# Patient Record
Sex: MALE | Race: WHITE | Employment: OTHER | URBAN - NONMETROPOLITAN AREA
[De-identification: names, ages, dates, MRNs, and addresses within clinical notes are randomized per-mention and may not be internally consistent; named-entity substitution may affect disease eponyms.]

---

## 2017-03-22 ENCOUNTER — OFFICE VISIT (OUTPATIENT)
Age: 47
End: 2017-03-22

## 2017-03-22 ENCOUNTER — TELEPHONE (OUTPATIENT)
Dept: FAMILY MEDICINE CLINIC | Age: 47
End: 2017-03-22

## 2017-03-22 VITALS
HEIGHT: 72 IN | RESPIRATION RATE: 16 BRPM | TEMPERATURE: 99.1 F | DIASTOLIC BLOOD PRESSURE: 64 MMHG | WEIGHT: 236 LBS | BODY MASS INDEX: 31.97 KG/M2 | HEART RATE: 88 BPM | SYSTOLIC BLOOD PRESSURE: 112 MMHG

## 2017-03-22 DIAGNOSIS — K61.0 PERIANAL ABSCESS: Primary | ICD-10-CM

## 2017-03-22 PROCEDURE — 99202 OFFICE O/P NEW SF 15 MIN: CPT | Performed by: SURGERY

## 2017-03-22 RX ORDER — CIPROFLOXACIN 500 MG/1
500 TABLET, FILM COATED ORAL 2 TIMES DAILY
Qty: 20 TABLET | Refills: 0 | Status: SHIPPED | OUTPATIENT
Start: 2017-03-22 | End: 2017-03-22 | Stop reason: ALTCHOICE

## 2017-03-22 ASSESSMENT — ENCOUNTER SYMPTOMS
FACIAL SWELLING: 0
WHEEZING: 0
NAUSEA: 0
CHOKING: 0
ABDOMINAL PAIN: 0
ROS SKIN COMMENTS: PERIANAL ABSCESS
STRIDOR: 0
VOMITING: 0
EYE DISCHARGE: 0
BLOOD IN STOOL: 0
SORE THROAT: 0
PHOTOPHOBIA: 0
SINUS PRESSURE: 0
COLOR CHANGE: 0
EYE REDNESS: 0
DIARRHEA: 0
RHINORRHEA: 0
TROUBLE SWALLOWING: 0
RECTAL PAIN: 1
CHEST TIGHTNESS: 0
ANAL BLEEDING: 1
APNEA: 0
BACK PAIN: 0
EYE ITCHING: 0
CONSTIPATION: 0
ABDOMINAL DISTENTION: 0
VOICE CHANGE: 0
COUGH: 0
SHORTNESS OF BREATH: 0
EYE PAIN: 0

## 2017-03-28 ENCOUNTER — TELEPHONE (OUTPATIENT)
Age: 47
End: 2017-03-28

## 2017-09-13 ENCOUNTER — TELEPHONE (OUTPATIENT)
Dept: SURGERY | Age: 47
End: 2017-09-13

## 2017-09-13 ENCOUNTER — TELEPHONE (OUTPATIENT)
Dept: FAMILY MEDICINE CLINIC | Age: 47
End: 2017-09-13

## 2017-09-13 RX ORDER — CIPROFLOXACIN 500 MG/1
500 TABLET, FILM COATED ORAL 2 TIMES DAILY
Qty: 20 TABLET | Refills: 0 | Status: SHIPPED | OUTPATIENT
Start: 2017-09-13 | End: 2017-09-23

## 2017-09-13 NOTE — TELEPHONE ENCOUNTER
Called for another perianal cyst  It opened and drained today  Started on cipro  He tried to contact your office today  Let me know what you would like to do with this or just call him  thanks

## 2018-08-21 ENCOUNTER — TELEPHONE (OUTPATIENT)
Dept: FAMILY MEDICINE CLINIC | Age: 48
End: 2018-08-21

## 2018-08-21 DIAGNOSIS — R07.9 CHEST PAIN, UNSPECIFIED TYPE: Primary | ICD-10-CM

## 2018-08-21 LAB
ALBUMIN SERPL-MCNC: 4.6 G/DL
ALP BLD-CCNC: 80 U/L
ALT SERPL-CCNC: 21 U/L
AMYLASE: 30 UNITS/L
ANION GAP SERPL CALCULATED.3IONS-SCNC: 11 MMOL/L
AST SERPL-CCNC: 19 U/L
BASOPHILS ABSOLUTE: NORMAL /ΜL
BASOPHILS RELATIVE PERCENT: NORMAL %
BILIRUB SERPL-MCNC: 0.6 MG/DL (ref 0.1–1.4)
BUN BLDV-MCNC: 17 MG/DL
CALCIUM SERPL-MCNC: 9.7 MG/DL
CHLORIDE BLD-SCNC: 107 MMOL/L
CO2: 28 MMOL/L
CREAT SERPL-MCNC: 0.9 MG/DL
EOSINOPHILS ABSOLUTE: NORMAL /ΜL
EOSINOPHILS RELATIVE PERCENT: NORMAL %
GFR CALCULATED: >60
GLUCOSE BLD-MCNC: 112 MG/DL
HCT VFR BLD CALC: 47.8 % (ref 41–53)
HEMOGLOBIN: 16.3 G/DL (ref 13.5–17.5)
LIPASE: 25 UNITS/L
LYMPHOCYTES ABSOLUTE: NORMAL /ΜL
LYMPHOCYTES RELATIVE PERCENT: NORMAL %
MCH RBC QN AUTO: 33.1 PG
MCHC RBC AUTO-ENTMCNC: 34.1 G/DL
MCV RBC AUTO: 96.9 FL
MONOCYTES ABSOLUTE: NORMAL /ΜL
MONOCYTES RELATIVE PERCENT: NORMAL %
NEUTROPHILS ABSOLUTE: NORMAL /ΜL
NEUTROPHILS RELATIVE PERCENT: NORMAL %
PDW BLD-RTO: 13.1 %
PLATELET # BLD: 235 K/ΜL
PMV BLD AUTO: 10.5 FL
POTASSIUM SERPL-SCNC: 3.8 MMOL/L
RBC # BLD: 4.93 10^6/ΜL
SODIUM BLD-SCNC: 142 MMOL/L
TOTAL PROTEIN: 7.1
TSH SERPL DL<=0.05 MIU/L-ACNC: 2.74 UIU/ML
WBC # BLD: 6 10^3/ML

## 2018-08-22 ENCOUNTER — TELEPHONE (OUTPATIENT)
Dept: FAMILY MEDICINE CLINIC | Age: 48
End: 2018-08-22

## 2018-08-22 DIAGNOSIS — R93.2 ABNORMAL GALLBLADDER ULTRASOUND: ICD-10-CM

## 2018-08-22 DIAGNOSIS — R10.10 UPPER ABDOMINAL PAIN: Primary | ICD-10-CM

## 2018-08-23 ENCOUNTER — TELEPHONE (OUTPATIENT)
Dept: FAMILY MEDICINE CLINIC | Age: 48
End: 2018-08-23

## 2018-08-23 DIAGNOSIS — R07.89 OTHER CHEST PAIN: ICD-10-CM

## 2018-08-23 DIAGNOSIS — R06.02 SOB (SHORTNESS OF BREATH) ON EXERTION: Primary | ICD-10-CM

## 2018-08-23 NOTE — TELEPHONE ENCOUNTER
Pt is to go to [facility] [address] on 08/28/2018 @ 7am. NPO 8 hours prior. [facility].

## 2018-09-04 ENCOUNTER — TELEPHONE (OUTPATIENT)
Dept: FAMILY MEDICINE CLINIC | Age: 48
End: 2018-09-04

## 2018-09-04 DIAGNOSIS — R07.9 CHEST PAIN ON EXERTION: ICD-10-CM

## 2018-09-04 DIAGNOSIS — R94.31 ABNORMAL EKG: ICD-10-CM

## 2018-09-04 DIAGNOSIS — R07.9 CHEST PAIN, UNSPECIFIED TYPE: Primary | ICD-10-CM

## 2018-09-12 ENCOUNTER — TELEPHONE (OUTPATIENT)
Dept: FAMILY MEDICINE CLINIC | Age: 48
End: 2018-09-12

## 2018-09-26 ENCOUNTER — TELEPHONE (OUTPATIENT)
Dept: FAMILY MEDICINE CLINIC | Age: 48
End: 2018-09-26

## 2018-09-26 DIAGNOSIS — Z01.818 PRE-OP TESTING: Primary | ICD-10-CM

## 2018-09-26 NOTE — TELEPHONE ENCOUNTER
Per direction of Dr. Chiquita Funez, recent lab results and new orders for CBC and BMP faxed to Dr. Tubbs Novel office @ [phone].

## 2018-09-26 NOTE — TELEPHONE ENCOUNTER
[facility] CALLED ASKING FOR ORDERS FOR US AND STRESS TEST TO BE FAXED [phone]. [address] FAXED.

## 2018-09-27 ENCOUNTER — TELEPHONE (OUTPATIENT)
Dept: FAMILY MEDICINE CLINIC | Age: 48
End: 2018-09-27

## 2018-09-27 DIAGNOSIS — I11.9 HYPERTENSIVE LEFT VENTRICULAR HYPERTROPHY, WITHOUT HEART FAILURE: Primary | ICD-10-CM

## 2018-09-27 DIAGNOSIS — I10 ESSENTIAL HYPERTENSION: ICD-10-CM

## 2018-09-27 LAB
BASOPHILS ABSOLUTE: NORMAL /ΜL
BASOPHILS ABSOLUTE: NORMAL /ΜL
BASOPHILS RELATIVE PERCENT: NORMAL %
BASOPHILS RELATIVE PERCENT: NORMAL %
BUN BLDV-MCNC: 18 MG/DL
CALCIUM SERPL-MCNC: 9.9 MG/DL
CHLORIDE BLD-SCNC: 107 MMOL/L
CO2: 28 MMOL/L
CREAT SERPL-MCNC: 0.88 MG/DL
EOSINOPHILS ABSOLUTE: NORMAL /ΜL
EOSINOPHILS ABSOLUTE: NORMAL /ΜL
EOSINOPHILS RELATIVE PERCENT: NORMAL %
EOSINOPHILS RELATIVE PERCENT: NORMAL %
GFR CALCULATED: >60
GLUCOSE BLD-MCNC: 73 MG/DL
HCT VFR BLD CALC: 48.6 % (ref 41–53)
HCT VFR BLD CALC: 48.6 % (ref 41–53)
HEMOGLOBIN: 16.5 G/DL (ref 13.5–17.5)
HEMOGLOBIN: 16.5 G/DL (ref 13.5–17.5)
LYMPHOCYTES ABSOLUTE: NORMAL /ΜL
LYMPHOCYTES ABSOLUTE: NORMAL /ΜL
LYMPHOCYTES RELATIVE PERCENT: NORMAL %
LYMPHOCYTES RELATIVE PERCENT: NORMAL %
MCH RBC QN AUTO: 33.1 PG
MCH RBC QN AUTO: 33.1 PG
MCHC RBC AUTO-ENTMCNC: 33.9 G/DL
MCHC RBC AUTO-ENTMCNC: 33.9 G/DL
MCV RBC AUTO: 98 FL
MCV RBC AUTO: 98 FL
MONOCYTES ABSOLUTE: NORMAL /ΜL
MONOCYTES ABSOLUTE: NORMAL /ΜL
MONOCYTES RELATIVE PERCENT: NORMAL %
MONOCYTES RELATIVE PERCENT: NORMAL %
NEUTROPHILS ABSOLUTE: NORMAL /ΜL
NEUTROPHILS ABSOLUTE: NORMAL /ΜL
NEUTROPHILS RELATIVE PERCENT: NORMAL %
NEUTROPHILS RELATIVE PERCENT: NORMAL %
PLATELET # BLD: 214 K/ΜL
PLATELET # BLD: 214 K/ΜL
PMV BLD AUTO: 10.5 FL
PMV BLD AUTO: NORMAL FL
POTASSIUM SERPL-SCNC: 4.5 MMOL/L
RBC # BLD: 4.98 10^6/ΜL
RBC # BLD: 4.98 10^6/ΜL
SODIUM BLD-SCNC: 144 MMOL/L
WBC # BLD: 5.2 10^3/ML
WBC # BLD: 5.2 10^3/ML

## 2018-09-27 NOTE — TELEPHONE ENCOUNTER
Estelita Cloud was seen today for hypertension. Diagnoses and all orders for this visit:    Hypertensive left ventricular hypertrophy, without heart failure  -     Echocardiogram complete; Future    Essential hypertension  -     Echocardiogram complete;  Future

## 2018-10-01 ENCOUNTER — TELEPHONE (OUTPATIENT)
Dept: FAMILY MEDICINE CLINIC | Age: 48
End: 2018-10-01

## 2018-10-03 ENCOUNTER — TELEPHONE (OUTPATIENT)
Dept: FAMILY MEDICINE CLINIC | Age: 48
End: 2018-10-03

## 2020-12-30 ENCOUNTER — TELEPHONE (OUTPATIENT)
Dept: FAMILY MEDICINE CLINIC | Age: 50
End: 2020-12-30

## 2020-12-30 ENCOUNTER — NURSE ONLY (OUTPATIENT)
Dept: LAB | Age: 50
End: 2020-12-30

## 2020-12-30 ENCOUNTER — NURSE ONLY (OUTPATIENT)
Dept: FAMILY MEDICINE CLINIC | Age: 50
End: 2020-12-30

## 2020-12-30 VITALS — HEART RATE: 81 BPM | SYSTOLIC BLOOD PRESSURE: 130 MMHG | DIASTOLIC BLOOD PRESSURE: 88 MMHG

## 2020-12-30 LAB
ALBUMIN SERPL-MCNC: 4.8 G/DL (ref 3.5–5.1)
ALP BLD-CCNC: 77 U/L (ref 38–126)
ALT SERPL-CCNC: 24 U/L (ref 11–66)
ANION GAP SERPL CALCULATED.3IONS-SCNC: 11 MEQ/L (ref 8–16)
AST SERPL-CCNC: 22 U/L (ref 5–40)
BILIRUB SERPL-MCNC: 0.5 MG/DL (ref 0.3–1.2)
BUN BLDV-MCNC: 13 MG/DL (ref 7–22)
CALCIUM SERPL-MCNC: 9.8 MG/DL (ref 8.5–10.5)
CHLORIDE BLD-SCNC: 103 MEQ/L (ref 98–111)
CHOLESTEROL, TOTAL: 269 MG/DL (ref 100–199)
CO2: 27 MEQ/L (ref 23–33)
CREAT SERPL-MCNC: 0.7 MG/DL (ref 0.4–1.2)
ERYTHROCYTE [DISTWIDTH] IN BLOOD BY AUTOMATED COUNT: 12.9 % (ref 11.5–14.5)
ERYTHROCYTE [DISTWIDTH] IN BLOOD BY AUTOMATED COUNT: 47.9 FL (ref 35–45)
GFR SERPL CREATININE-BSD FRML MDRD: > 90 ML/MIN/1.73M2
GLUCOSE FASTING: 99 MG/DL (ref 70–108)
HCT VFR BLD CALC: 49.7 % (ref 42–52)
HDLC SERPL-MCNC: 63 MG/DL
HEMOGLOBIN: 16.1 GM/DL (ref 14–18)
LDL CHOLESTEROL CALCULATED: 188 MG/DL
MCH RBC QN AUTO: 32.7 PG (ref 26–33)
MCHC RBC AUTO-ENTMCNC: 32.4 GM/DL (ref 32.2–35.5)
MCV RBC AUTO: 100.8 FL (ref 80–94)
PLATELET # BLD: 232 THOU/MM3 (ref 130–400)
PMV BLD AUTO: 12.1 FL (ref 9.4–12.4)
POTASSIUM SERPL-SCNC: 4.1 MEQ/L (ref 3.5–5.2)
PROSTATE SPECIFIC ANTIGEN: 1.45 NG/ML (ref 0–1)
RBC # BLD: 4.93 MILL/MM3 (ref 4.7–6.1)
SODIUM BLD-SCNC: 141 MEQ/L (ref 135–145)
TOTAL PROTEIN: 7.6 G/DL (ref 6.1–8)
TRIGL SERPL-MCNC: 89 MG/DL (ref 0–199)
TSH SERPL DL<=0.05 MIU/L-ACNC: 1.91 UIU/ML (ref 0.4–4.2)
WBC # BLD: 6 THOU/MM3 (ref 4.8–10.8)

## 2020-12-30 PROCEDURE — 99999 PR OFFICE/OUTPT VISIT,PROCEDURE ONLY: CPT | Performed by: FAMILY MEDICINE

## 2020-12-30 NOTE — PROGRESS NOTES
Patient presents for blood pressure check.      Patient states just not feeling well, seeing spots    BP: 130/88  P:     81

## 2021-01-21 ENCOUNTER — TELEPHONE (OUTPATIENT)
Dept: FAMILY MEDICINE CLINIC | Age: 51
End: 2021-01-21

## 2021-01-21 DIAGNOSIS — R19.8 ABDOMINAL GUARDING: ICD-10-CM

## 2021-01-21 DIAGNOSIS — R10.829 REBOUND ABDOMINAL TENDERNESS: ICD-10-CM

## 2021-01-21 DIAGNOSIS — R10.31 ACUTE RIGHT LOWER QUADRANT PAIN: Primary | ICD-10-CM

## 2021-01-21 DIAGNOSIS — Z01.818 PREOP EXAMINATION: Primary | ICD-10-CM

## 2021-01-21 LAB
ALBUMIN SERPL-MCNC: 4.2 G/DL
ALP BLD-CCNC: 71 U/L
ALT SERPL-CCNC: 22 U/L
AMYLASE: 48 UNITS/L
ANION GAP SERPL CALCULATED.3IONS-SCNC: 10 MMOL/L
AST SERPL-CCNC: 18 U/L
BASOPHILS ABSOLUTE: 0 /ΜL
BASOPHILS RELATIVE PERCENT: 0.9 %
BILIRUB SERPL-MCNC: 0.6 MG/DL (ref 0.1–1.4)
BILIRUBIN, URINE: NEGATIVE
BLOOD, URINE: NEGATIVE
BUN BLDV-MCNC: NORMAL MG/DL
CALCIUM SERPL-MCNC: 8.9 MG/DL
CHLORIDE BLD-SCNC: 104 MMOL/L
CLARITY: CLEAR
CO2: 27 MMOL/L
COLOR: YELLOW
CREAT SERPL-MCNC: 0.82 MG/DL
EOSINOPHILS ABSOLUTE: 0.3 /ΜL
EOSINOPHILS RELATIVE PERCENT: 5.2 %
GFR CALCULATED: >60
GLUCOSE BLD-MCNC: 90 MG/DL
GLUCOSE URINE: NEGATIVE
HCT VFR BLD CALC: 45 % (ref 41–53)
HEMOGLOBIN: 15.4 G/DL (ref 13.5–17.5)
KETONES, URINE: NEGATIVE
LEUKOCYTE ESTERASE, URINE: NEGATIVE
LIPASE: 31 UNITS/L
LYMPHOCYTES ABSOLUTE: 1.4 /ΜL
LYMPHOCYTES RELATIVE PERCENT: 28.1 %
MCH RBC QN AUTO: 32.9 PG
MCHC RBC AUTO-ENTMCNC: 34.3 G/DL
MCV RBC AUTO: 96 FL
MONOCYTES ABSOLUTE: 0.4 /ΜL
MONOCYTES RELATIVE PERCENT: 8.7 %
NEUTROPHILS ABSOLUTE: 2.9 /ΜL
NEUTROPHILS RELATIVE PERCENT: 57.1 %
NITRITE, URINE: NEGATIVE
PH UA: 6 (ref 4.5–8)
PLATELET # BLD: 242 K/ΜL
PMV BLD AUTO: 10 FL
POTASSIUM SERPL-SCNC: 3.7 MMOL/L
PROTEIN UA: NEGATIVE
RBC # BLD: 4.69 10^6/ΜL
SODIUM BLD-SCNC: 141 MMOL/L
SPECIFIC GRAVITY, URINE: 1.02
TOTAL PROTEIN: 7.7
UROBILINOGEN, URINE: NORMAL
WBC # BLD: 5.1 10^3/ML

## 2021-01-21 RX ORDER — CIPROFLOXACIN 500 MG/1
500 TABLET, FILM COATED ORAL 2 TIMES DAILY
Qty: 28 TABLET | Refills: 0 | Status: SHIPPED | OUTPATIENT
Start: 2021-01-21 | End: 2021-09-08 | Stop reason: SDUPTHER

## 2021-01-21 RX ORDER — METRONIDAZOLE 500 MG/1
500 TABLET ORAL 3 TIMES DAILY
Qty: 30 TABLET | Refills: 0 | Status: SHIPPED | OUTPATIENT
Start: 2021-01-21 | End: 2021-09-08 | Stop reason: SDUPTHER

## 2021-01-21 NOTE — TELEPHONE ENCOUNTER
Clear fluids for 24 hours then soft foods until pain is gone  Start cipro, if not better in 3 days then start flagyl (no alcohol while taking it)

## 2021-01-21 NOTE — TELEPHONE ENCOUNTER
Patient called with sharp right lower quadrant pain  He reports that when he pushes on that area it is much worse (guarding) and if he lets go really fast it is even more sharp (rebound). he has also had chills on and off since the pain started 5 days ago. Diagnoses and all orders for this visit:    Acute right lower quadrant pain  -     CBC; Future  -     Comprehensive Metabolic Panel, Fasting; Future  -     Amylase; Future  -     Lipase; Future  -     Urine Rt Reflex To Culture  -     CT ABDOMEN PELVIS WO CONTRAST Additional Contrast? None; Future    Rebound abdominal tenderness  -     CBC; Future  -     Comprehensive Metabolic Panel, Fasting; Future  -     Amylase; Future  -     Lipase; Future  -     Urine Rt Reflex To Culture  -     CT ABDOMEN PELVIS WO CONTRAST Additional Contrast? None; Future    Abdominal guarding  -     CBC; Future  -     Comprehensive Metabolic Panel, Fasting; Future  -     Amylase; Future  -     Lipase;  Future  -     Urine Rt Reflex To Culture  -     CT ABDOMEN PELVIS WO CONTRAST Additional Contrast? None; Future

## 2021-02-18 ENCOUNTER — TELEPHONE (OUTPATIENT)
Dept: FAMILY MEDICINE CLINIC | Age: 51
End: 2021-02-18

## 2021-02-18 DIAGNOSIS — K57.92 DIVERTICULITIS: Primary | ICD-10-CM

## 2021-02-18 NOTE — TELEPHONE ENCOUNTER
Schedule repeat abdomen and pelvis CT to recheck diverticulitis in April or May in [facility]  Call wife with approval she may be able to schedule at the hospital

## 2021-02-18 NOTE — TELEPHONE ENCOUNTER
Order has been differed until 21 as too soon to approve d/t service date will  prior to pt having testing completed.      Will notify wife once referral is closer to due date

## 2021-05-18 ENCOUNTER — TELEPHONE (OUTPATIENT)
Dept: FAMILY MEDICINE CLINIC | Age: 51
End: 2021-05-18

## 2021-05-18 NOTE — TELEPHONE ENCOUNTER
Pt in [place] to have CT abdomen pelvis completed. Per telephone encounter from 02/18/2021 states pt should have a repeat of CT abdomen/pelvis to recheck diverticulitis in April or May in [facility]. Wanted to confirm he is needing this done before authorizing and scheduling.

## 2021-07-26 ENCOUNTER — TELEPHONE (OUTPATIENT)
Dept: FAMILY MEDICINE CLINIC | Age: 51
End: 2021-07-26

## 2021-07-26 ENCOUNTER — HOSPITAL ENCOUNTER (OUTPATIENT)
Dept: CT IMAGING | Age: 51
Discharge: HOME | End: 2021-07-26
Payer: COMMERCIAL

## 2021-07-26 ENCOUNTER — HOSPITAL ENCOUNTER (OUTPATIENT)
Dept: CT IMAGING | Age: 51
Discharge: HOME | End: 2021-07-26

## 2021-07-26 DIAGNOSIS — E78.00 PURE HYPERCHOLESTEROLEMIA: ICD-10-CM

## 2021-07-26 DIAGNOSIS — R19.8 ABDOMINAL GUARDING: ICD-10-CM

## 2021-07-26 DIAGNOSIS — Z00.6 EXAMINATION FOR NORMAL COMPARISON OR CONTROL IN CLINICAL RESEARCH: ICD-10-CM

## 2021-07-26 DIAGNOSIS — K57.92 DIVERTICULITIS: Primary | ICD-10-CM

## 2021-07-26 DIAGNOSIS — R10.31 ACUTE RIGHT LOWER QUADRANT PAIN: ICD-10-CM

## 2021-07-26 DIAGNOSIS — N40.0 ENLARGED PROSTATE: ICD-10-CM

## 2021-07-26 DIAGNOSIS — R16.1 SPLEEN ENLARGED: ICD-10-CM

## 2021-07-26 DIAGNOSIS — K57.92 DIVERTICULITIS: ICD-10-CM

## 2021-07-26 PROCEDURE — 6360000004 HC RX CONTRAST MEDICATION: Performed by: FAMILY MEDICINE

## 2021-07-26 PROCEDURE — 74177 CT ABD & PELVIS W/CONTRAST: CPT

## 2021-07-26 RX ADMIN — IOPAMIDOL 100 ML: 755 INJECTION, SOLUTION INTRAVENOUS at 11:31

## 2021-07-26 RX ADMIN — IOHEXOL 50 ML: 240 INJECTION, SOLUTION INTRATHECAL; INTRAVASCULAR; INTRAVENOUS; ORAL at 10:25

## 2021-07-27 ENCOUNTER — TELEPHONE (OUTPATIENT)
Dept: FAMILY MEDICINE CLINIC | Age: 51
End: 2021-07-27

## 2021-07-28 LAB
ALBUMIN SERPL-MCNC: 4.4 G/DL
ALP BLD-CCNC: 57 U/L
ALT SERPL-CCNC: 27 U/L
AMYLASE: 30 UNITS/L
ANION GAP SERPL CALCULATED.3IONS-SCNC: 11 MMOL/L
AST SERPL-CCNC: 29 U/L
BASOPHILS ABSOLUTE: NORMAL
BASOPHILS RELATIVE PERCENT: NORMAL
BILIRUB SERPL-MCNC: 0.4 MG/DL (ref 0.1–1.4)
BUN BLDV-MCNC: 12 MG/DL
CALCIUM SERPL-MCNC: 9.6 MG/DL
CHLORIDE BLD-SCNC: 105 MMOL/L
CHOLESTEROL, TOTAL: 177 MG/DL
CHOLESTEROL/HDL RATIO: NORMAL
CO2: 27 MMOL/L
CREAT SERPL-MCNC: 1 MG/DL
EOSINOPHILS ABSOLUTE: NORMAL
EOSINOPHILS RELATIVE PERCENT: NORMAL
GFR CALCULATED: >60
GLUCOSE BLD-MCNC: 100 MG/DL
HCT VFR BLD CALC: 45.8 % (ref 41–53)
HDLC SERPL-MCNC: 50 MG/DL (ref 35–70)
HEMOGLOBIN: 15.5 G/DL (ref 13.5–17.5)
LDL CHOLESTEROL CALCULATED: 116 MG/DL (ref 0–160)
LIPASE: 20 UNITS/L
LYMPHOCYTES ABSOLUTE: NORMAL
LYMPHOCYTES RELATIVE PERCENT: NORMAL
MCH RBC QN AUTO: 33.1 PG
MCHC RBC AUTO-ENTMCNC: 33.9 G/DL
MCV RBC AUTO: 97.8 FL
MONOCYTES ABSOLUTE: NORMAL
MONOCYTES RELATIVE PERCENT: NORMAL
NEUTROPHILS ABSOLUTE: NORMAL
NEUTROPHILS RELATIVE PERCENT: NORMAL
NONHDLC SERPL-MCNC: NORMAL MG/DL
PLATELET # BLD: 255 K/ΜL
PMV BLD AUTO: 10.3 FL
POTASSIUM SERPL-SCNC: 4.3 MMOL/L
RBC # BLD: 4.68 10^6/ΜL
SODIUM BLD-SCNC: 139 MMOL/L
TOTAL PROTEIN: 7.2
TRIGL SERPL-MCNC: 59 MG/DL
VLDLC SERPL CALC-MCNC: 12 MG/DL
WBC # BLD: 5.5 10^3/ML

## 2021-08-30 ENCOUNTER — TELEPHONE (OUTPATIENT)
Dept: FAMILY MEDICINE CLINIC | Age: 51
End: 2021-08-30

## 2021-08-30 DIAGNOSIS — R19.7 WATERY DIARRHEA: Primary | ICD-10-CM

## 2021-08-30 DIAGNOSIS — R10.32 LLQ PAIN: ICD-10-CM

## 2021-09-08 ENCOUNTER — TELEPHONE (OUTPATIENT)
Dept: FAMILY MEDICINE CLINIC | Age: 51
End: 2021-09-08

## 2021-09-08 ENCOUNTER — OFFICE VISIT (OUTPATIENT)
Dept: FAMILY MEDICINE CLINIC | Age: 51
End: 2021-09-08
Payer: COMMERCIAL

## 2021-09-08 VITALS
WEIGHT: 247 LBS | SYSTOLIC BLOOD PRESSURE: 122 MMHG | BODY MASS INDEX: 33.5 KG/M2 | RESPIRATION RATE: 16 BRPM | DIASTOLIC BLOOD PRESSURE: 80 MMHG | HEART RATE: 86 BPM | TEMPERATURE: 98.1 F

## 2021-09-08 DIAGNOSIS — R19.8 ABDOMINAL GUARDING: ICD-10-CM

## 2021-09-08 DIAGNOSIS — Z87.19 HISTORY OF DIVERTICULITIS: ICD-10-CM

## 2021-09-08 DIAGNOSIS — R10.829 REBOUND ABDOMINAL TENDERNESS: ICD-10-CM

## 2021-09-08 DIAGNOSIS — R10.31 ACUTE RIGHT LOWER QUADRANT PAIN: Primary | ICD-10-CM

## 2021-09-08 LAB
BILIRUBIN URINE: NEGATIVE
BLOOD URINE, POC: NEGATIVE
CHARACTER, URINE: CLEAR
COLOR, URINE: YELLOW
GLUCOSE URINE: NEGATIVE MG/DL
KETONES, URINE: NEGATIVE
LEUKOCYTE CLUMPS, URINE: NEGATIVE
NITRITE, URINE: NEGATIVE
PH, URINE: 6 (ref 5–9)
PROTEIN, URINE: NEGATIVE MG/DL
SPECIFIC GRAVITY, URINE: 1.02 (ref 1–1.03)
UROBILINOGEN, URINE: 0.2 EU/DL (ref 0–1)

## 2021-09-08 PROCEDURE — 81003 URINALYSIS AUTO W/O SCOPE: CPT | Performed by: FAMILY MEDICINE

## 2021-09-08 PROCEDURE — 99213 OFFICE O/P EST LOW 20 MIN: CPT | Performed by: FAMILY MEDICINE

## 2021-09-08 RX ORDER — CIPROFLOXACIN 500 MG/1
500 TABLET, FILM COATED ORAL 2 TIMES DAILY
Qty: 28 TABLET | Refills: 0 | Status: SHIPPED | OUTPATIENT
Start: 2021-09-08 | End: 2021-09-22

## 2021-09-08 RX ORDER — METRONIDAZOLE 500 MG/1
500 TABLET ORAL 3 TIMES DAILY
Qty: 30 TABLET | Refills: 0 | Status: SHIPPED | OUTPATIENT
Start: 2021-09-08 | End: 2021-09-18

## 2021-09-08 SDOH — ECONOMIC STABILITY: FOOD INSECURITY: WITHIN THE PAST 12 MONTHS, YOU WORRIED THAT YOUR FOOD WOULD RUN OUT BEFORE YOU GOT MONEY TO BUY MORE.: NEVER TRUE

## 2021-09-08 SDOH — ECONOMIC STABILITY: FOOD INSECURITY: WITHIN THE PAST 12 MONTHS, THE FOOD YOU BOUGHT JUST DIDN'T LAST AND YOU DIDN'T HAVE MONEY TO GET MORE.: NEVER TRUE

## 2021-09-08 ASSESSMENT — SOCIAL DETERMINANTS OF HEALTH (SDOH): HOW HARD IS IT FOR YOU TO PAY FOR THE VERY BASICS LIKE FOOD, HOUSING, MEDICAL CARE, AND HEATING?: NOT HARD AT ALL

## 2021-09-08 NOTE — PROGRESS NOTES
[address] [address] [place]   Dept: [phone]  Dept Fax: [phone]  Loc: Bienvenido Pichardo is a 44 y.o. male who presents today for:  Chief Complaint   Patient presents with    Follow-up           HPI:     HPI  Here for recurrent right lower quadrant pain. He had similar pain 1 month ago treated with flagyl and cipro when CT revealed diverticulitis. He seemed to get better until 1 week ago when he was exposed to and then had 3-4 days of gastroenteritis. The pain is getting worse and now he is unable to lay flat due to pain. No blood or black stools. Reviewed chart forpast medical history , surgical history , allergies, social history , family history and medications. Health Maintenance   Topic Date Due    Hepatitis C screen  Never done    COVID-19 Vaccine (1) Never done    HIV screen  Never done    DTaP/Tdap/Td vaccine (1 - Tdap) Never done    Shingles Vaccine (1 of 2) Never done    Flu vaccine (1) Never done    Lipid screen  07/28/2026    Colon cancer screen colonoscopy  08/24/2026    Hepatitis A vaccine  Aged Out    Hepatitis B vaccine  Aged Out    Hib vaccine  Aged Out    Meningococcal (ACWY) vaccine  Aged Out    Pneumococcal 0-64 years Vaccine  Aged Out       Subjective:      Constitutional:Negative for fever, chills, diaphoresis, activity change, appetite change and fatigue. HENT: Negative for hearing loss, ear pain, congestion, sore throat, rhinorrhea, postnasal drip and ear discharge. Eyes: Negative for photophobia and visual disturbance. Respiratory: Negative for cough, chest tightness, shortness of breath and wheezing. Cardiovascular: Negative for chest pain and leg swelling. Gastrointestinal: Negative for nausea, vomiting, abdominal pain, diarrhea and constipation. Genitourinary: Negative for dysuria, urgency and frequency.    Neurological: Negative for weakness, light-headedness and headaches. Psychiatric/Behavioral: Negative for sleep disturbance.      :     Vitals:    09/08/21 1107   BP: 122/80   Site: Left Upper Arm   Position: Sitting   Cuff Size: Large Adult   Pulse: 86   Resp: 16   Temp: 98.1 °F (36.7 °C)   TempSrc: Temporal   Weight: 247 lb (112 kg)     Wt Readings from Last 3 Encounters:   09/08/21 247 lb (112 kg)   03/22/17 236 lb (107 kg)       Physical Exam  Physical Exam   Constitutional: Vital signs are normal. He appears well-developed and well-nourished. He is active. HENT:   Head: Normocephalic and atraumatic. Right Ear: Tympanic membrane, external ear and ear canal normal. No drainage or tenderness. Left Ear: Tympanic membrane, external ear and ear canal normal. No drainage or tenderness. Nose: Nose normal. No mucosal edema or rhinorrhea. Mouth/Throat: Uvula is midline, oropharynx is clear and moist and mucous membranes are normal. Mucous membranes are not pale. Normal dentition. No posterior oropharyngeal edema or posterior oropharyngeal erythema. Eyes: Lids are normal. Right eye exhibits no chemosis and no discharge. Left eye exhibits no chemosis and no drainage. Right conjunctiva has no hemorrhage. Left conjunctiva has no hemorrhage. Right eye exhibits normal extraocular motion. Left eye exhibits normal extraocular motion. Right pupil is round and reactive. Left pupil is round and reactive. Pupils are equal.   Cardiovascular: Normal rate, regular rhythm, S1 normal, S2 normal and normal heart sounds. Exam reveals no gallop. No murmur heard. Pulmonary/Chest: Effort normal and breath sounds normal. No respiratory distress. He has no wheezes. He has no rhonchi. He has no rales. Abdominal: Soft. Normal appearance and bowel sounds are normal. He exhibits no distension and no mass. There is no hepatosplenomegaly. No tenderness. He has no rigidity, no rebound and no guarding. No hernia.    Musculoskeletal:        Right lower leg: He exhibits no edema. Left lower leg: He exhibits no edema. Neurological: He is alert. Oriented and pleasent    Results for POC orders placed in visit on 09/08/21   POCT Urinalysis No Micro (Auto)   Result Value Ref Range    Glucose, Ur Negative NEGATIVE mg/dl    Bilirubin Urine Negative     Ketones, Urine Negative NEGATIVE    Specific Gravity, Urine 1.025 1.002 - 1.030    Blood, UA POC Negative NEGATIVE    pH, Urine 6.00 5.0 - 9.0    Protein, Urine Negative NEGATIVE mg/dl    Urobilinogen, Urine 0.20 0.0 - 1.0 eu/dl    Nitrite, Urine Negative NEGATIVE    Leukocyte Clumps, Urine Negative NEGATIVE    Color, Urine Yellow [id number]    Character, Urine Clear [id number]           Assessment/Plan   Ingrid Ch was seen today for follow-up. Diagnoses and all orders for this visit:    Acute right lower quadrant pain  -     CT ABDOMEN PELVIS W IV CONTRAST Additional Contrast? None; Future  -     CBC; Future  -     Comprehensive Metabolic Panel, Fasting; Future  -     Amylase; Future  -     Lipase; Future  -     POCT Urinalysis No Micro (Auto)    Rebound abdominal tenderness  -     CT ABDOMEN PELVIS W IV CONTRAST Additional Contrast? None; Future  -     CBC; Future  -     Comprehensive Metabolic Panel, Fasting; Future  -     Amylase; Future  -     Lipase; Future  -     POCT Urinalysis No Micro (Auto)    History of diverticulitis  -     CT ABDOMEN PELVIS W IV CONTRAST Additional Contrast? None; Future  -     CBC; Future  -     Comprehensive Metabolic Panel, Fasting; Future  -     Amylase; Future  -     Lipase; Future  -     POCT Urinalysis No Micro (Auto)    Abdominal guarding  -     CT ABDOMEN PELVIS W IV CONTRAST Additional Contrast? None; Future  -     CBC; Future  -     Comprehensive Metabolic Panel, Fasting; Future  -     Amylase; Future  -     Lipase; Future  -     POCT Urinalysis No Micro (Auto)      Push fluids and will treat based on results of testing.     Discussed use, benefit, and side effectsof prescribed medications. All patient questions answered. Pt voiced understanding. Reviewed health maintenance. Instructed to continue current medications, diet and exercise. Patient agreed with treatment plan. Followup as directed.      Electronically signed by Andreea Paredes MD

## 2021-09-09 LAB
ALBUMIN SERPL-MCNC: 3.6 G/DL
ALP BLD-CCNC: 52 U/L
ALT SERPL-CCNC: 20 U/L
AMYLASE: 43 UNITS/L
ANION GAP SERPL CALCULATED.3IONS-SCNC: 12 MMOL/L
AST SERPL-CCNC: 16 U/L
BASOPHILS ABSOLUTE: 0 /ΜL
BASOPHILS RELATIVE PERCENT: 0.5 %
BILIRUB SERPL-MCNC: 0.5 MG/DL (ref 0.1–1.4)
BUN BLDV-MCNC: NORMAL MG/DL
CALCIUM SERPL-MCNC: 8.7 MG/DL
CHLORIDE BLD-SCNC: 101 MMOL/L
CO2: 28 MMOL/L
CREAT SERPL-MCNC: 0.79 MG/DL
EOSINOPHILS ABSOLUTE: 0.1 /ΜL
EOSINOPHILS RELATIVE PERCENT: 1.5 %
GFR CALCULATED: >60
GLUCOSE BLD-MCNC: 89 MG/DL
HCT VFR BLD CALC: 39.7 % (ref 41–53)
HEMOGLOBIN: 13.7 G/DL (ref 13.5–17.5)
LIPASE: 27 UNITS/L
LYMPHOCYTES ABSOLUTE: 1.7 /ΜL
LYMPHOCYTES RELATIVE PERCENT: 20.6 %
MCH RBC QN AUTO: 33 PG
MCHC RBC AUTO-ENTMCNC: 34.5 G/DL
MCV RBC AUTO: 96 FL
MONOCYTES ABSOLUTE: 0.8 /ΜL
MONOCYTES RELATIVE PERCENT: 9.2 %
NEUTROPHILS ABSOLUTE: 5.6 /ΜL
NEUTROPHILS RELATIVE PERCENT: 68.2 %
PLATELET # BLD: 267 K/ΜL
PMV BLD AUTO: 10.1 FL
POTASSIUM SERPL-SCNC: 3.6 MMOL/L
RBC # BLD: 4.14 10^6/ΜL
SODIUM BLD-SCNC: 141 MMOL/L
TOTAL PROTEIN: 7.1
WBC # BLD: 8.2 10^3/ML

## 2021-10-28 ENCOUNTER — TELEPHONE (OUTPATIENT)
Dept: FAMILY MEDICINE CLINIC | Age: 51
End: 2021-10-28

## 2021-10-28 RX ORDER — METRONIDAZOLE 500 MG/1
500 TABLET ORAL 3 TIMES DAILY
Qty: 30 TABLET | Refills: 0 | Status: SHIPPED | OUTPATIENT
Start: 2021-10-28 | End: 2021-11-07

## 2021-10-28 RX ORDER — CIPROFLOXACIN 500 MG/1
500 TABLET, FILM COATED ORAL 2 TIMES DAILY
Qty: 20 TABLET | Refills: 0 | Status: SHIPPED | OUTPATIENT
Start: 2021-10-28 | End: 2021-11-07

## 2021-12-01 ENCOUNTER — TELEPHONE (OUTPATIENT)
Dept: FAMILY MEDICINE CLINIC | Age: 51
End: 2021-12-01

## 2021-12-01 DIAGNOSIS — Z01.818 PREOP EXAMINATION: Primary | ICD-10-CM

## 2021-12-07 LAB
ALBUMIN SERPL-MCNC: 4.6 G/DL
ALP BLD-CCNC: 80 U/L
ALT SERPL-CCNC: 23 U/L
ANION GAP SERPL CALCULATED.3IONS-SCNC: NORMAL MMOL/L
AST SERPL-CCNC: 18 U/L
BASOPHILS ABSOLUTE: NORMAL
BASOPHILS RELATIVE PERCENT: NORMAL
BILIRUB SERPL-MCNC: 0.7 MG/DL (ref 0.1–1.4)
BUN BLDV-MCNC: 14 MG/DL
CALCIUM SERPL-MCNC: 9.4 MG/DL
CHLORIDE BLD-SCNC: 107 MMOL/L
CO2: 28 MMOL/L
CREAT SERPL-MCNC: 0.86 MG/DL
EOSINOPHILS ABSOLUTE: NORMAL
EOSINOPHILS RELATIVE PERCENT: NORMAL
GFR CALCULATED: >60
GLUCOSE BLD-MCNC: 84 MG/DL
HCT VFR BLD CALC: 45.1 % (ref 41–53)
HEMOGLOBIN: 15.3 G/DL (ref 13.5–17.5)
LYMPHOCYTES ABSOLUTE: NORMAL
LYMPHOCYTES RELATIVE PERCENT: NORMAL
MCH RBC QN AUTO: 33.3 PG
MCHC RBC AUTO-ENTMCNC: 33.9 G/DL
MCV RBC AUTO: 98 FL
MONOCYTES ABSOLUTE: NORMAL
MONOCYTES RELATIVE PERCENT: NORMAL
NEUTROPHILS ABSOLUTE: NORMAL
NEUTROPHILS RELATIVE PERCENT: NORMAL
PDW BLD-RTO: 13.8 %
PLATELET # BLD: 192 K/ΜL
PMV BLD AUTO: 11 FL
POTASSIUM SERPL-SCNC: 4.2 MMOL/L
RBC # BLD: 4.6 10^6/ΜL
SODIUM BLD-SCNC: 143 MMOL/L
TOTAL PROTEIN: 7
WBC # BLD: 8.2 10^3/ML

## 2021-12-13 ENCOUNTER — TELEPHONE (OUTPATIENT)
Dept: FAMILY MEDICINE CLINIC | Age: 51
End: 2021-12-13

## 2021-12-13 RX ORDER — MOXIFLOXACIN 5 MG/ML
1 SOLUTION/ DROPS OPHTHALMIC 3 TIMES DAILY
Qty: 3 ML | Refills: 0 | Status: SHIPPED | OUTPATIENT
Start: 2021-12-13 | End: 2021-12-20

## 2022-09-19 ENCOUNTER — TELEPHONE (OUTPATIENT)
Dept: FAMILY MEDICINE CLINIC | Age: 52
End: 2022-09-19

## 2022-09-20 ENCOUNTER — TELEPHONE (OUTPATIENT)
Dept: FAMILY MEDICINE CLINIC | Age: 52
End: 2022-09-20

## 2022-09-20 NOTE — LETTER
[facility] [address] [address]  Phone: [phone]  Fax: [phone]           Joey Alcantara MD      September 20, 2022     Patient: Mounika Jimenez   MR Number: [id number]   YOB: 1970   Date of Visit: 9/20/2022       Dear Lup[address] Dr is medically cleared to have surgery on 9/26/22.           If you have questions or concerns, please do not hesitate to call    Sincerely,        Joey Alcantara MD

## 2022-09-20 NOTE — LETTER
[facility] [address] [address]  Phone: [phone]  Fax: [phone]    Vandana Sanchez MD        September 20, 2022    [facility]      Dear Dr Fidelia Ramos,    My patient Feroz Palumbo is medically clear to have surgery on 9/26/22. If you have any questions or concerns, please don't hesitate to call.     Sincerely,        Vandana Sanchez MD

## 2022-11-16 ENCOUNTER — TELEPHONE (OUTPATIENT)
Dept: FAMILY MEDICINE CLINIC | Age: 52
End: 2022-11-16

## 2022-11-16 RX ORDER — MOXIFLOXACIN 5 MG/ML
1 SOLUTION/ DROPS OPHTHALMIC 3 TIMES DAILY
Qty: 3 ML | Refills: 0 | Status: SHIPPED | OUTPATIENT
Start: 2022-11-16 | End: 2022-11-23

## 2022-12-27 ENCOUNTER — TELEPHONE (OUTPATIENT)
Dept: FAMILY MEDICINE CLINIC | Age: 52
End: 2022-12-27

## 2022-12-27 DIAGNOSIS — J02.9 ACUTE PHARYNGITIS, UNSPECIFIED ETIOLOGY: Primary | ICD-10-CM

## 2022-12-27 RX ORDER — AZITHROMYCIN 250 MG/1
TABLET, FILM COATED ORAL
Qty: 1 PACKET | Refills: 0 | Status: SHIPPED | OUTPATIENT
Start: 2022-12-27

## 2022-12-27 RX ORDER — PREDNISONE 20 MG/1
20 TABLET ORAL 2 TIMES DAILY
Qty: 10 TABLET | Refills: 0 | Status: SHIPPED | OUTPATIENT
Start: 2022-12-27 | End: 2023-01-01

## 2022-12-27 NOTE — TELEPHONE ENCOUNTER
Persistent  sore throat    Diagnoses and all orders for this visit:    Acute pharyngitis, unspecified etiology  -     azithromycin (ZITHROMAX) 250 MG tablet; Take 2 tabs (500 mg) on Day 1, and take 1 tab (250 mg) on days 2 through 5.    Other orders  -     predniSONE (DELTASONE) 20 MG tablet;  Take 1 tablet by mouth 2 times daily for 5 days

## 2023-11-06 ENCOUNTER — TELEPHONE (OUTPATIENT)
Dept: FAMILY MEDICINE CLINIC | Age: 53
End: 2023-11-06

## 2023-11-06 RX ORDER — AZITHROMYCIN 250 MG/1
TABLET, FILM COATED ORAL
Qty: 1 PACKET | Refills: 0 | Status: SHIPPED | OUTPATIENT
Start: 2023-11-06

## 2023-11-06 RX ORDER — PREDNISONE 20 MG/1
40 TABLET ORAL DAILY
Qty: 10 TABLET | Refills: 0 | Status: SHIPPED | OUTPATIENT
Start: 2023-11-06 | End: 2023-11-11

## 2024-03-05 ENCOUNTER — TELEPHONE (OUTPATIENT)
Dept: FAMILY MEDICINE CLINIC | Age: 54
End: 2024-03-05

## 2024-03-05 RX ORDER — FAMOTIDINE 40 MG/1
40 TABLET, FILM COATED ORAL EVERY EVENING
Qty: 90 TABLET | Refills: 3 | Status: SHIPPED | OUTPATIENT
Start: 2024-03-05

## 2024-03-21 ENCOUNTER — OFFICE VISIT (OUTPATIENT)
Dept: FAMILY MEDICINE CLINIC | Age: 54
End: 2024-03-21
Payer: COMMERCIAL

## 2024-03-21 VITALS
OXYGEN SATURATION: 99 % | BODY MASS INDEX: 32.97 KG/M2 | HEIGHT: 72 IN | DIASTOLIC BLOOD PRESSURE: 84 MMHG | SYSTOLIC BLOOD PRESSURE: 134 MMHG | WEIGHT: 243.39 LBS | RESPIRATION RATE: 16 BRPM | TEMPERATURE: 98.8 F | HEART RATE: 78 BPM

## 2024-03-21 DIAGNOSIS — N63.20 MASS OF LEFT BREAST, UNSPECIFIED QUADRANT: Primary | ICD-10-CM

## 2024-03-21 PROCEDURE — 99213 OFFICE O/P EST LOW 20 MIN: CPT | Performed by: FAMILY MEDICINE

## 2024-03-21 RX ORDER — PSEUDOEPHEDRINE HCL 30 MG
100 TABLET ORAL 2 TIMES DAILY
COMMUNITY
Start: 2022-09-28 | End: 2024-03-21

## 2024-03-21 RX ORDER — SULFAMETHOXAZOLE AND TRIMETHOPRIM 800; 160 MG/1; MG/1
1 TABLET ORAL 2 TIMES DAILY
Qty: 20 TABLET | Refills: 0 | Status: SHIPPED | OUTPATIENT
Start: 2024-03-21 | End: 2024-03-31

## 2024-03-21 SDOH — ECONOMIC STABILITY: FOOD INSECURITY: WITHIN THE PAST 12 MONTHS, YOU WORRIED THAT YOUR FOOD WOULD RUN OUT BEFORE YOU GOT MONEY TO BUY MORE.: NEVER TRUE

## 2024-03-21 SDOH — ECONOMIC STABILITY: HOUSING INSECURITY
IN THE LAST 12 MONTHS, WAS THERE A TIME WHEN YOU DID NOT HAVE A STEADY PLACE TO SLEEP OR SLEPT IN A SHELTER (INCLUDING NOW)?: NO

## 2024-03-21 SDOH — ECONOMIC STABILITY: INCOME INSECURITY: HOW HARD IS IT FOR YOU TO PAY FOR THE VERY BASICS LIKE FOOD, HOUSING, MEDICAL CARE, AND HEATING?: NOT HARD AT ALL

## 2024-03-21 SDOH — ECONOMIC STABILITY: FOOD INSECURITY: WITHIN THE PAST 12 MONTHS, THE FOOD YOU BOUGHT JUST DIDN'T LAST AND YOU DIDN'T HAVE MONEY TO GET MORE.: NEVER TRUE

## 2024-03-21 NOTE — PROGRESS NOTES
[facility] PROFESSIONAL SERVS  [facility] MEDICINE  [address]  [address]  [facility] [address]  Dept: [phone]  Dept Fax: [phone]  Loc: [phone]    Rodrick Begum is a 53 y.o. male who presents today for:  Chief Complaint   Patient presents with    Mass     Lump above left breast noticed about 2 days ago        HPI:     HPI  Here for 2 days of a lump under the left nipple.  Not painful.   Infected hair follicle on top of the area.     Reviewed chart forpast medical history , surgical history , allergies, social history , family history and medications.    Health Maintenance   Topic Date Due    Hepatitis B vaccine (1 of 3 - 3-dose series) Never done    COVID-19 Vaccine (1) Never done    Depression Screen  Never done    HIV screen  Never done    Hepatitis C screen  Never done    DTaP/Tdap/Td vaccine (1 - Tdap) Never done    Shingles vaccine (1 of 2) Never done    Flu vaccine (1) 03/21/2025 (Originally 8/1/2023)    Lipids  07/28/2026    Colorectal Cancer Screen  08/24/2026    Hepatitis A vaccine  Aged Out    Hib vaccine  Aged Out    Polio vaccine  Aged Out    Meningococcal (ACWY) vaccine  Aged Out    Pneumococcal 0-64 years Vaccine  Aged Out       Subjective:      Constitutional:Negative for fever, chills, diaphoresis, activity change, appetite change and fatigue.   HENT: Negative for hearing loss, ear pain, congestion, sore throat, rhinorrhea, postnasal drip and ear discharge.    Eyes: Negative for photophobia and visual disturbance.   Respiratory: Negative for cough, chest tightness, shortness of breath and wheezing.    Cardiovascular: Negative for chest pain and leg swelling.   Gastrointestinal: Negative for nausea, vomiting, abdominal pain, diarrhea and constipation.   Genitourinary: Negative for dysuria, urgency and frequency.   Neurological: Negative for weakness, light-headedness and headaches.   Psychiatric/Behavioral: Negative for sleep disturbance.      :     Vitals:

## 2024-03-25 ENCOUNTER — TELEPHONE (OUTPATIENT)
Dept: FAMILY MEDICINE CLINIC | Age: 54
End: 2024-03-25

## 2024-03-25 DIAGNOSIS — N63.20 MASS OF LEFT BREAST, UNSPECIFIED QUADRANT: Primary | ICD-10-CM

## 2024-03-27 ENCOUNTER — HOSPITAL ENCOUNTER (OUTPATIENT)
Dept: WOMENS IMAGING | Age: 54
Discharge: HOME | End: 2024-03-27
Attending: RADIOLOGY

## 2024-03-27 DIAGNOSIS — Z00.6 ENCOUNTER FOR EXAMINATION FOR NORMAL COMPARISON OR CONTROL IN CLINICAL RESEARCH PROGRAM: ICD-10-CM

## 2024-04-01 ENCOUNTER — TELEPHONE (OUTPATIENT)
Dept: FAMILY MEDICINE CLINIC | Age: 54
End: 2024-04-01

## 2024-04-01 DIAGNOSIS — N63.0 BREAST MASS IN MALE: Primary | ICD-10-CM

## 2024-10-23 ENCOUNTER — TELEPHONE (OUTPATIENT)
Dept: FAMILY MEDICINE CLINIC | Age: 54
End: 2024-10-23

## 2024-10-23 DIAGNOSIS — N63.20 MASS OF LEFT BREAST, UNSPECIFIED QUADRANT: ICD-10-CM

## 2024-10-23 DIAGNOSIS — N63.0 BREAST MASS IN MALE: Primary | ICD-10-CM

## 2025-07-22 ENCOUNTER — TELEPHONE (OUTPATIENT)
Dept: FAMILY MEDICINE CLINIC | Age: 55
End: 2025-07-22

## 2025-07-22 RX ORDER — FUROSEMIDE 20 MG/1
20 TABLET ORAL DAILY
Qty: 5 TABLET | Refills: 0 | Status: SHIPPED | OUTPATIENT
Start: 2025-07-22